# Patient Record
Sex: MALE | HISPANIC OR LATINO | Employment: UNEMPLOYED | URBAN - METROPOLITAN AREA
[De-identification: names, ages, dates, MRNs, and addresses within clinical notes are randomized per-mention and may not be internally consistent; named-entity substitution may affect disease eponyms.]

---

## 2018-03-31 ENCOUNTER — OFFICE VISIT (OUTPATIENT)
Dept: URGENT CARE | Facility: PHYSICIAN GROUP | Age: 38
End: 2018-03-31
Payer: COMMERCIAL

## 2018-03-31 VITALS
DIASTOLIC BLOOD PRESSURE: 80 MMHG | HEART RATE: 97 BPM | TEMPERATURE: 97.9 F | RESPIRATION RATE: 16 BRPM | BODY MASS INDEX: 29.82 KG/M2 | HEIGHT: 71 IN | SYSTOLIC BLOOD PRESSURE: 128 MMHG | WEIGHT: 213 LBS | OXYGEN SATURATION: 96 %

## 2018-03-31 DIAGNOSIS — L30.9 ECZEMA, UNSPECIFIED TYPE: ICD-10-CM

## 2018-03-31 PROCEDURE — 99214 OFFICE O/P EST MOD 30 MIN: CPT | Performed by: NURSE PRACTITIONER

## 2018-03-31 RX ORDER — TRIAMCINOLONE ACETONIDE OINTMENT USP, 0.05% 0.5 MG/G
OINTMENT TOPICAL
Qty: 60 G | Refills: 0 | Status: SHIPPED | OUTPATIENT
Start: 2018-03-31 | End: 2021-03-06

## 2018-03-31 NOTE — PROGRESS NOTES
"Chief Complaint   Patient presents with   • Rash     for a \"while\" on and off.       HISTORY OF PRESENT ILLNESS: Patient is a 37 y.o. male who presents to urgent care today with complaints of a rash. States his rash is reoccurring, and has been for years. He has been seen and treated by dermatology for this in the past and diagnosed with eczema, today's rash is exactly similar. He has been using OTC creams without improvement. States he otherwise feels well. He denies fever, chills, malaise, joint pain. No known contacts have similar rash. He denies any new soaps, allergens, detergents etc. He does not have a PCP for follow-up.        There are no active problems to display for this patient.      Allergies:Patient has no known allergies.    Current Outpatient Prescriptions Ordered in [facility]   Medication Sig Dispense Refill   • TRIAMCINOLONE ACETONIDE, TOP, 0.05 % Ointment Apply thin layer to affected areas BID prn 60 g 0     No current Epic-ordered facility-administered medications on file.        Past Medical History:   Diagnosis Date   • Eczema        Social History   Substance Use Topics   • Smoking status: Current Every Day Smoker   • Smokeless tobacco: Never Used   • Alcohol use No       No family status information on file.   History reviewed. No pertinent family history.    ROS:  Review of Systems   Constitutional: Negative for fever, chills, weight loss, malaise, and fatigue.   HENT: Negative for ear pain, nosebleeds, congestion, sore throat and neck pain.    Eyes: Negative for vision changes.   Neuro: Negative for headache, sensory changes, weakness, seizure, LOC.   Cardiovascular: Negative for chest pain, palpitations, orthopnea and leg swelling.   Respiratory: Negative for cough, sputum production, shortness of breath and wheezing.   Gastrointestinal: Negative for abdominal pain, nausea, vomiting or diarrhea.   Genitourinary: Negative for dysuria, urgency and frequency.  Musculoskeletal: Negative for falls, " "neck pain, back pain, joint pain, myalgias.   Skin: Positive for rash. Negative for diaphoresis.     Exam:  Blood pressure 128/80, pulse 97, temperature 36.6 °C (97.9 °F), resp. rate 16, height 1.803 m (5' 11\"), weight 96.6 kg (213 lb), SpO2 96 %.  General: well-nourished, well-developed male in NAD  Head: normocephalic, atraumatic  Eyes: PERRLA, no conjunctival injection, acuity grossly intact, lids normal.  Ears: normal shape and symmetry. Gross auditory acuity is intact.  Nose: symmetrical without tenderness, no discharge.  Mouth/Throat: reasonable hygiene, no erythema, exudates or tonsillar enlargement.  Neck: no masses, range of motion within normal limits, no tracheal deviation. No obvious thyroid enlargement.   Lymph: no cervical adenopathy. No supraclavicular adenopathy.   Neuro: alert and oriented. Cranial nerves 1-12 grossly intact. No sensory deficit.   Cardiovascular: regular rate and rhythm. No edema.  Pulmonary: no distress. Chest is symmetrical with respiration, no wheezes, crackles, or rhonchi.   Musculoskeletal: no clubbing, appropriate muscle tone, gait is stable.  Skin: warm, dry, intact, no clubbing, no cyanosis. Warm, pink, dry.  Left hand with scattered maculopapular rash to dorsal aspect. Right hand has one small, clumped vesicular and scabbed lesion to dorsal aspect. No pustules, streaking, or drainage.   Psych: appropriate mood, affect, judgement.         Assessment/Plan:  1. Eczema, unspecified type  TRIAMCINOLONE ACETONIDE, TOP, 0.05 % Ointment         Supportive care, differential diagnoses, and indications for immediate follow-up discussed with patient.   Pathogenesis of diagnosis discussed including typical length and natural progression.   Instructed to return to clinic or nearest emergency department for any change in condition, further concerns, or worsening of symptoms.  Patient states understanding of the plan of care and discharge instructions.  Instructed to make an appointment, " to establish care and for follow up, with a primary care provider.  Previous clinic visit encounter reviewed and considered in medical decision making today.         Please note that this dictation was created using voice recognition software. I have made every reasonable attempt to correct obvious errors, but I expect that there are errors of grammar and possibly content that I did not discover before finalizing the note.      REGINO Howe.

## 2018-04-17 ENCOUNTER — SUPERVISING PHYSICIAN REVIEW (OUTPATIENT)
Dept: URGENT CARE | Facility: PHYSICIAN GROUP | Age: 38
End: 2018-04-17

## 2019-04-22 ENCOUNTER — OFFICE VISIT (OUTPATIENT)
Dept: URGENT CARE | Facility: PHYSICIAN GROUP | Age: 39
End: 2019-04-22
Payer: COMMERCIAL

## 2019-04-22 VITALS
TEMPERATURE: 98.4 F | HEART RATE: 75 BPM | BODY MASS INDEX: 26.6 KG/M2 | HEIGHT: 71 IN | DIASTOLIC BLOOD PRESSURE: 70 MMHG | OXYGEN SATURATION: 98 % | SYSTOLIC BLOOD PRESSURE: 130 MMHG | WEIGHT: 190 LBS

## 2019-04-22 DIAGNOSIS — N48.9 LESION OF PENIS: ICD-10-CM

## 2019-04-22 DIAGNOSIS — L30.9 ECZEMA OF HAND: ICD-10-CM

## 2019-04-22 PROCEDURE — 99213 OFFICE O/P EST LOW 20 MIN: CPT | Performed by: FAMILY MEDICINE

## 2019-04-22 RX ORDER — TRIAMCINOLONE ACETONIDE 5 MG/G
1 OINTMENT TOPICAL 2 TIMES DAILY PRN
Qty: 1 TUBE | Refills: 2 | Status: SHIPPED | OUTPATIENT
Start: 2019-04-22 | End: 2019-07-09 | Stop reason: SDUPTHER

## 2019-04-22 ASSESSMENT — ENCOUNTER SYMPTOMS
FEVER: 0
SHORTNESS OF BREATH: 0
SORE THROAT: 0

## 2019-04-22 NOTE — PROGRESS NOTES
"Subjective:     Angus Flores is a 38 y.o. male who presents for Rash      HPI  Pt presents for evaluation of a new problem   Pt with lesion on penis which has been present for the past several months   Has not been evaluated by a physician before   Claims that the area feels \"bumpy\"   The area is stable and not worsening   Not painful   No associated dysuria      Also having eczema of the hands which is a chronic problem   Has previously used triamcinolone with good results   Has been trying OTC topical hydrocortisone and not improving   Has been present the past 2 weeks and not improving     Review of Systems   Constitutional: Negative for fever.   HENT: Negative for sore throat.    Respiratory: Negative for shortness of breath.    Cardiovascular: Negative for chest pain.   Genitourinary: Negative for dysuria and hematuria.   Skin: Positive for itching and rash.       PMH:  has a past medical history of Eczema. He also has no past medical history of Allergy; ASTHMA; or Diabetes.  MEDS:   Current Outpatient Prescriptions:   •  triamcinolone (ARISTOCORT) 0.5 % ointment, Apply 1 Application to affected area(s) 2 times a day as needed., Disp: 1 Tube, Rfl: 2  •  TRIAMCINOLONE ACETONIDE, TOP, 0.05 % Ointment, Apply thin layer to affected areas BID prn, Disp: 60 g, Rfl: 0  ALLERGIES: No Known Allergies  SURGHX: History reviewed. No pertinent surgical history.  SOCHX:  reports that he has been smoking.  He has never used smokeless tobacco. He reports that he does not drink alcohol or use drugs.     Objective:   /70   Pulse 75   Temp 36.9 °C (98.4 °F) (Temporal)   Ht 1.803 m (5' 11\")   Wt 86.2 kg (190 lb)   SpO2 98%   BMI 26.50 kg/m²     Physical Exam   Constitutional: He is oriented to person, place, and time. He appears well-developed and well-nourished. No distress.   HENT:   Head: Normocephalic and atraumatic.   Genitourinary: Penis normal. Circumcised.   Genitourinary Comments: Glans of penis with faint " erythema and dry appearing skin, no papules, macules, or lumps palpated    Neurological: He is alert and oriented to person, place, and time.   Skin: Skin is warm and dry. He is not diaphoretic.   Few small areas of dry and erythematous skin over the dorsal aspect of bilateral hands    Psychiatric: He has a normal mood and affect. His behavior is normal. Judgment and thought content normal.     Assessment/Plan:   Assessment    1. Eczema of hand  - triamcinolone (ARISTOCORT) 0.5 % ointment; Apply 1 Application to affected area(s) 2 times a day as needed.  Dispense: 1 Tube; Refill: 2    2. Lesion of penis  Unclear if eczema of glans of penis versus very mild case of condyloma.  Patient will be further evaluated by urology for second opinion.  - REFERRAL TO UROLOGY

## 2019-06-04 ENCOUNTER — OFFICE VISIT (OUTPATIENT)
Dept: MEDICAL GROUP | Facility: PHYSICIAN GROUP | Age: 39
End: 2019-06-04
Payer: COMMERCIAL

## 2019-06-04 VITALS
TEMPERATURE: 97.8 F | HEIGHT: 71 IN | HEART RATE: 92 BPM | WEIGHT: 190 LBS | DIASTOLIC BLOOD PRESSURE: 72 MMHG | SYSTOLIC BLOOD PRESSURE: 110 MMHG | OXYGEN SATURATION: 96 % | RESPIRATION RATE: 12 BRPM | BODY MASS INDEX: 26.6 KG/M2

## 2019-06-04 DIAGNOSIS — G89.29 CHRONIC MIDLINE THORACIC BACK PAIN: ICD-10-CM

## 2019-06-04 DIAGNOSIS — Z23 NEED FOR VACCINATION: ICD-10-CM

## 2019-06-04 DIAGNOSIS — N48.9 LESION OF PENIS: ICD-10-CM

## 2019-06-04 DIAGNOSIS — L20.84 INTRINSIC ECZEMA: ICD-10-CM

## 2019-06-04 DIAGNOSIS — M54.6 CHRONIC MIDLINE THORACIC BACK PAIN: ICD-10-CM

## 2019-06-04 DIAGNOSIS — Z00.00 ROUTINE HEALTH MAINTENANCE: ICD-10-CM

## 2019-06-04 PROCEDURE — 99213 OFFICE O/P EST LOW 20 MIN: CPT | Mod: 25 | Performed by: NURSE PRACTITIONER

## 2019-06-04 PROCEDURE — 90715 TDAP VACCINE 7 YRS/> IM: CPT | Performed by: NURSE PRACTITIONER

## 2019-06-04 PROCEDURE — 90471 IMMUNIZATION ADMIN: CPT | Performed by: NURSE PRACTITIONER

## 2019-06-04 NOTE — ASSESSMENT & PLAN NOTE
"This is a chronic problem for the patient that has been going on for \"a while\".  Patient states that he was previously engaged and his ex fiancée had mentioned once during oral sex that his penis \"felt bumpy\".  The patient was unaware of the lesion on the tip of his penis as it does not cause pain.  Recently, it has been causing some itchiness, he is embarrassed by it, which is interfering with his sexual life.  The patient was seen in urgent care for this issue and prescribed a steroid cream and referred to urology.  Patient states that he has been seen by the urologist and was told that they do not know what the lesion is, possibly a bad case of eczema.  The patient was prescribed another cream and states that he has noticed mild improvement but it has not resolved.  States the urologist had considered biopsy or laser.  Patient states that urgent care provider and urologist told him that it did not look like it was herpes or contagious.  "

## 2019-06-04 NOTE — ASSESSMENT & PLAN NOTE
This is a chronic problem for the patient that is ongoing.  Patient has not been seen for this issue past.  States that during middle school screening he was told that he had scoliosis, it is unsure of what degree, has never been seen for this issue, has never received treatment, and states that he did not even tell his parents.  Patient currently works 2 jobs and is noticing some thoracic back pain at the end of his day that is mild.

## 2019-06-04 NOTE — PROGRESS NOTES
"CC:   Chief Complaint   Patient presents with   • Establish Care     new patient     HISTORY OF THE PRESENT ILLNESS: Patient is a 38 y.o. male. This pleasant patient is here today to establish care and discuss multiple issues as listed below.    Health Maintenance: Completed    Intrinsic eczema  This is a chronic problem for the patient that has been going on for \"a while\".  Patient states that he was previously engaged and his ex fiancée had mentioned once during oral sex that his penis \"felt bumpy\".  The patient was unaware of the lesion on the tip of his penis as it does not cause pain.  Recently, it has been causing some itchiness, he is embarrassed by it, which is interfering with his sexual life.  The patient was seen in urgent care for this issue and prescribed a steroid cream and referred to urology.  Patient states that he has been seen by the urologist and was told that they do not know what the lesion is, possibly a bad case of eczema.  The patient was prescribed another cream and states that he has noticed mild improvement but it has not resolved.  States the urologist had considered biopsy or laser.  Patient states that urgent care provider and urologist told him that it did not look like it was herpes or contagious.    Chronic midline thoracic back pain  This is a chronic problem for the patient that is ongoing.  Patient has not been seen for this issue past.  States that during middle school screening he was told that he had scoliosis, it is unsure of what degree, has never been seen for this issue, has never received treatment, and states that he did not even tell his parents.  Patient currently works 2 jobs and is noticing some thoracic back pain at the end of his day that is mild.    Allergies: Patient has no known allergies.    Current Outpatient Prescriptions Ordered in [facility]   Medication Sig Dispense Refill   • triamcinolone (ARISTOCORT) 0.5 % ointment Apply 1 Application to affected area(s) 2 " times a day as needed. 1 Tube 2   • TRIAMCINOLONE ACETONIDE, TOP, 0.05 % Ointment Apply thin layer to affected areas BID prn 60 g 0     No current Epic-ordered facility-administered medications on file.        Past Medical History:   Diagnosis Date   • Eczema        History reviewed. No pertinent surgical history.    Social History   Substance Use Topics   • Smoking status: Former Smoker     Quit date: 12/2018   • Smokeless tobacco: Never Used      Comment: Marijuana, not tobacco   • Alcohol use No       Social History     Social History Narrative   • No narrative on file       Family History   Problem Relation Age of Onset   • Lung Disease Mother    • Obesity Mother    • Obesity Sister    • Heart Attack Brother    • Stroke Brother    • No Known Problems Maternal Grandmother    • Alcohol abuse Maternal Grandfather    • No Known Problems Sister    • Other Sister         Bell's Palsy   • Obesity Brother      ROS:   Constitutional:  Negative for fever, chills, unexpected weight change, night sweats, body aches, sleep issues, and fatigue/generalized weakness.   HEENT:  Negative for headaches, vision changes, hearing changes, ear pain, tinnitus, ear discharge, rhinorrhea, sinus congestion, sneezing, sore throat, and neck pain.    Respiratory:  Negative for cough, shortness of breath, sputum production, hemoptysis, chest congestion, dyspnea, wheezing, and crackles.    Cardiovascular:  Negative for chest pain, palpitations, JOHNS, paroxsymal nocturnal dyspnea, orthopnea, and bilateral lower extremity edema.   Gastrointestinal:  Negative for heartburn, nausea, vomiting, abdominal pain, hematochezia, melena, diarrhea, constipation, and greasy/foul-smelling stools.   Genitourinary: Positive for intermittent pruritic lesion on tip of penis.  Negative for dysuria, nocturia, polyuria, hematuria, pyuria, urinary urgency, urinary frequency, and urinary incontinence.   Musculoskeletal: Positive for thoracic back pain.  Negative for  "myalgias and joint pain.   Skin: Positive for intermittent pruritic lesion on tip of penis.  Neurological:  Negative for dizziness, tingling, tremors, focal sensory deficit, focal weakness and headaches.   Psychiatric/Behavioral: Negative for depression, suicidal/homicidal ideation and memory loss.        Exam: /72   Pulse 92   Temp 36.6 °C (97.8 °F)   Resp 12   Ht 1.803 m (5' 11\")   Wt 86.2 kg (190 lb)   SpO2 96%  Body mass index is 26.5 kg/m².    General:  Normal appearing. No distress.  HEENT:  Normocephalic. Eyes conjunctiva clear lids without ptosis, pupils equal and reactive to light accommodation, ears normal shape and contour, canals are clear bilaterally, tympanic membranes are benign, nasal mucosa benign, oropharynx is without erythema, edema or exudates.  Neck:  Supple without JVD or bruit.  Pulmonary:  Clear to ausculation.  Normal effort. No rales, ronchi, or wheezing.  Cardiovascular:  Regular rate and rhythm without murmur. Carotid and radial pulses are intact and equal bilaterally.  Abdomen:  Soft, nontender, nondistended. Normal bowel sounds.  Neurologic:  Grossly nonfocal.  Skin: Reports \"bumpy lesion on tip of penis\" declined assessment. Warm and dry.  Musculoskeletal:  Normal gait. No extremity cyanosis, clubbing, or edema.  Psych:  Normal mood and affect. Alert and oriented x3. Judgment and insight is normal.    Assessment/Plan:  1. Intrinsic eczema  REFERRAL TO DERMATOLOGY   2. Lesion of penis  REFERRAL TO DERMATOLOGY   3. Chronic midline thoracic back pain  DX-THORACIC SPINE-2 VIEWS   4. Routine health maintenance  Comp Metabolic Panel    Lipid Profile   5. Need for vaccination  Tdap =>6yo IM - given today     Educated in proper administration of medication(s) ordered today including safety, possible SE, risks, benefits, rationale and alternatives to therapy.   Supportive care, differential diagnoses, and indications for immediate follow-up discussed with patient.    Pathogenesis " of diagnosis discussed including typical length and natural progression.    Instructed to return to clinic or nearest emergency department for any change in condition, further concerns, or worsening of symptoms.  Patient states understanding of the plan of care and discharge instructions.    Return in about 1 month (around 7/4/2019) for Follow up Labs, Follow up Diagnostics.    I have placed the below orders and discussed them with an approved delegating provider. The MA is performing the below orders under the direction of Dr. Finch.    Please note that this dictation was created using voice recognition software. I have made every reasonable attempt to correct obvious errors, but I expect that there are errors of grammar and possibly content that I did not discover before finalizing the note.

## 2019-07-09 ENCOUNTER — OFFICE VISIT (OUTPATIENT)
Dept: MEDICAL GROUP | Facility: PHYSICIAN GROUP | Age: 39
End: 2019-07-09
Payer: COMMERCIAL

## 2019-07-09 ENCOUNTER — HOSPITAL ENCOUNTER (OUTPATIENT)
Dept: LAB | Facility: MEDICAL CENTER | Age: 39
End: 2019-07-09
Attending: NURSE PRACTITIONER
Payer: COMMERCIAL

## 2019-07-09 VITALS
DIASTOLIC BLOOD PRESSURE: 68 MMHG | SYSTOLIC BLOOD PRESSURE: 106 MMHG | BODY MASS INDEX: 26.6 KG/M2 | RESPIRATION RATE: 12 BRPM | WEIGHT: 190 LBS | HEIGHT: 71 IN | TEMPERATURE: 98.5 F | HEART RATE: 93 BPM | OXYGEN SATURATION: 96 %

## 2019-07-09 DIAGNOSIS — Z00.00 ROUTINE HEALTH MAINTENANCE: ICD-10-CM

## 2019-07-09 DIAGNOSIS — G89.29 CHRONIC MIDLINE THORACIC BACK PAIN: ICD-10-CM

## 2019-07-09 DIAGNOSIS — M54.6 CHRONIC MIDLINE THORACIC BACK PAIN: ICD-10-CM

## 2019-07-09 DIAGNOSIS — L30.9 ECZEMA OF HAND: ICD-10-CM

## 2019-07-09 DIAGNOSIS — L20.84 INTRINSIC ECZEMA: ICD-10-CM

## 2019-07-09 LAB
ALBUMIN SERPL BCP-MCNC: 4.6 G/DL (ref 3.2–4.9)
ALBUMIN/GLOB SERPL: 1.7 G/DL
ALP SERPL-CCNC: 46 U/L (ref 30–99)
ALT SERPL-CCNC: 15 U/L (ref 2–50)
ANION GAP SERPL CALC-SCNC: 9 MMOL/L (ref 0–11.9)
AST SERPL-CCNC: 12 U/L (ref 12–45)
BILIRUB SERPL-MCNC: 0.6 MG/DL (ref 0.1–1.5)
BUN SERPL-MCNC: 25 MG/DL (ref 8–22)
CALCIUM SERPL-MCNC: 10.1 MG/DL (ref 8.5–10.5)
CHLORIDE SERPL-SCNC: 106 MMOL/L (ref 96–112)
CHOLEST SERPL-MCNC: 165 MG/DL (ref 100–199)
CO2 SERPL-SCNC: 27 MMOL/L (ref 20–33)
CREAT SERPL-MCNC: 0.91 MG/DL (ref 0.5–1.4)
FASTING STATUS PATIENT QL REPORTED: NORMAL
GLOBULIN SER CALC-MCNC: 2.7 G/DL (ref 1.9–3.5)
GLUCOSE SERPL-MCNC: 92 MG/DL (ref 65–99)
HDLC SERPL-MCNC: 58 MG/DL
LDLC SERPL CALC-MCNC: 96 MG/DL
POTASSIUM SERPL-SCNC: 3.8 MMOL/L (ref 3.6–5.5)
PROT SERPL-MCNC: 7.3 G/DL (ref 6–8.2)
SODIUM SERPL-SCNC: 142 MMOL/L (ref 135–145)
TRIGL SERPL-MCNC: 57 MG/DL (ref 0–149)

## 2019-07-09 PROCEDURE — 36415 COLL VENOUS BLD VENIPUNCTURE: CPT

## 2019-07-09 PROCEDURE — 80053 COMPREHEN METABOLIC PANEL: CPT

## 2019-07-09 PROCEDURE — 80061 LIPID PANEL: CPT

## 2019-07-09 PROCEDURE — 99213 OFFICE O/P EST LOW 20 MIN: CPT | Performed by: NURSE PRACTITIONER

## 2019-07-09 RX ORDER — TRIAMCINOLONE ACETONIDE 5 MG/G
1 OINTMENT TOPICAL 2 TIMES DAILY PRN
Qty: 1 TUBE | Refills: 2 | Status: SHIPPED | OUTPATIENT
Start: 2019-07-09 | End: 2021-03-06

## 2019-07-09 ASSESSMENT — PATIENT HEALTH QUESTIONNAIRE - PHQ9: CLINICAL INTERPRETATION OF PHQ2 SCORE: 0

## 2019-07-09 NOTE — PROGRESS NOTES
CC:   Chief Complaint   Patient presents with   • Follow-Up     HISTORY OF THE PRESENT ILLNESS: Patient is a 38 y.o. male. This pleasant patient is here for his one month follow up to review lab results and xray results. The patient has not completed the labs or xray, states he has more questions.     Health Maintenance: Completed    Intrinsic eczema  This is a problem that is ongoing for the patient. The patient is requesting a refill of the aristocort 0.5% ointment. Was referred to dermatology for this problem at his last visit, states that he was not contacted with information on who to schedule an appointment with. Information provided.    Chronic midline thoracic back pain  This is an ongoing problem for the patient. At his last appointment he had requested an xray of his back, but he has not yet had this completed. He is wondering if there is anything that could be done to fix his back pain and if getting the xray is even worth it. Reviewed possible causes of back pain and possible therapies including physical therapy, braces, specialists. Patient would like to think about whether or not to get the xray.     Allergies: Patient has no known allergies.    Current Outpatient Prescriptions Ordered in [facility]   Medication Sig Dispense Refill   • triamcinolone (ARISTOCORT) 0.5 % ointment Apply 1 Application to affected area(s) 2 times a day as needed. 1 Tube 2   • TRIAMCINOLONE ACETONIDE, TOP, 0.05 % Ointment Apply thin layer to affected areas BID prn 60 g 0     No current Epic-ordered facility-administered medications on file.      Past Medical History:   Diagnosis Date   • Eczema      History reviewed. No pertinent surgical history.    Social History   Substance Use Topics   • Smoking status: Former Smoker     Quit date: 12/2018   • Smokeless tobacco: Never Used      Comment: Marijuana, not tobacco   • Alcohol use No     Social History     Social History Narrative   • No narrative on file     Family History   Problem  "Relation Age of Onset   • Lung Disease Mother    • Obesity Mother    • Obesity Sister    • Heart Attack Brother    • Stroke Brother    • No Known Problems Maternal Grandmother    • Alcohol abuse Maternal Grandfather    • No Known Problems Sister    • Other Sister         Bell's Palsy   • Obesity Brother      ROS:   Constitutional:  Negative for fever, chills, unexpected weight change, night sweats, body aches, sleep issues, and fatigue/generalized weakness.   Respiratory:  Negative for cough, shortness of breath, sputum production, hemoptysis, chest congestion, dyspnea, wheezing, and crackles.    Cardiovascular:  Negative for chest pain, palpitations, JOHNS, paroxsymal nocturnal dyspnea, orthopnea, and bilateral lower extremity edema.   Gastrointestinal:  Negative for heartburn, nausea, vomiting, abdominal pain, hematochezia, melena, diarrhea, constipation, and greasy/foul-smelling stools.   Musculoskeletal: Positive for thoracic back pain.  Negative for myalgias and joint pain.   Skin: Positive for intermittent pruritic lesion on tip of penis.  Psychiatric/Behavioral: Negative for depression, suicidal/homicidal ideation and memory loss.        Exam: /68   Pulse 93   Temp 36.9 °C (98.5 °F)   Resp 12   Ht 1.803 m (5' 11\")   Wt 86.2 kg (190 lb)   SpO2 96%  Body mass index is 26.5 kg/m².    General:  Normal appearing. No distress.  HEENT:  Normocephalic. Eyes conjunctiva clear lids without ptosis, pupils equal and reactive to light accommodation, ears normal shape and contour.  Neck:  Supple without JVD or bruit.  Pulmonary:  Clear to ausculation.  Normal effort. No rales, ronchi, or wheezing.  Cardiovascular:  Regular rate and rhythm without murmur. Carotid and radial pulses are intact and equal bilaterally.  Neurologic:  Grossly nonfocal.  Skin: Reports \"bumpy lesion on tip of penis\" declined assessment. Warm and dry.  Musculoskeletal:  Normal gait. No extremity cyanosis, clubbing, or edema.  Psych:  Normal " mood and affect. Alert and oriented x3. Judgment and insight is normal.    Assessment/Plan:  1. Intrinsic eczema  triamcinolone (ARISTOCORT) 0.5 % ointment  Contact information for dermatology referral provided to patient   2. Chronic midline thoracic back pain  Previously ordered xray, patient is still deciding whether or not he would like to get this done     Patient plans to go to the lab after this appointment to get previously ordered labs drawn.    Educated in proper administration of medication(s) ordered today including safety, possible SE, risks, benefits, rationale and alternatives to therapy.   Supportive care, differential diagnoses, and indications for immediate follow-up discussed with patient.    Pathogenesis of diagnosis discussed including typical length and natural progression.    Instructed to return to clinic or nearest emergency department for any change in condition, further concerns, or worsening of symptoms.  Patient states understanding of the plan of care and discharge instructions.    Return for Follow up Labs, Follow up Diagnostics, As needed.    Please note that this dictation was created using voice recognition software. I have made every reasonable attempt to correct obvious errors, but I expect that there are errors of grammar and possibly content that I did not discover before finalizing the note.

## 2019-07-09 NOTE — ASSESSMENT & PLAN NOTE
This is an ongoing problem for the patient. At his last appointment he had requested an xray of his back, but he has not yet had this completed. He is wondering if there is anything that could be done to fix his back pain and if getting the xray is even worth it. Reviewed possible causes of back pain and possible therapies including physical therapy, braces, specialists. Patient would like to think about whether or not to get the xray.

## 2019-07-09 NOTE — ASSESSMENT & PLAN NOTE
This is a problem that is ongoing for the patient. The patient is requesting a refill of the aristocort 0.5% ointment. Was referred to dermatology for this problem at his last visit, states that he was not contacted with information on who to schedule an appointment with. Information provided.

## 2020-12-10 DIAGNOSIS — L30.9 ECZEMA OF HAND: ICD-10-CM

## 2020-12-10 NOTE — TELEPHONE ENCOUNTER
Received request via: Pharmacy    Was the patient seen in the last year in this department? NO last seen on 07/09/2019    Does the patient have an active prescription (recently filled or refills available) for medication(s) requested? No

## 2020-12-11 RX ORDER — TRIAMCINOLONE ACETONIDE 5 MG/G
OINTMENT TOPICAL
Qty: 15 G | Refills: 0 | OUTPATIENT
Start: 2020-12-11

## 2021-03-06 ENCOUNTER — OFFICE VISIT (OUTPATIENT)
Dept: URGENT CARE | Facility: PHYSICIAN GROUP | Age: 41
End: 2021-03-06
Payer: COMMERCIAL

## 2021-03-06 VITALS
WEIGHT: 210 LBS | BODY MASS INDEX: 29.4 KG/M2 | HEART RATE: 97 BPM | TEMPERATURE: 97.5 F | SYSTOLIC BLOOD PRESSURE: 138 MMHG | OXYGEN SATURATION: 97 % | HEIGHT: 71 IN | DIASTOLIC BLOOD PRESSURE: 98 MMHG | RESPIRATION RATE: 15 BRPM

## 2021-03-06 DIAGNOSIS — L40.9 PSORIASIS: ICD-10-CM

## 2021-03-06 PROCEDURE — 99213 OFFICE O/P EST LOW 20 MIN: CPT | Performed by: FAMILY MEDICINE

## 2021-03-06 RX ORDER — TRIAMCINOLONE ACETONIDE 5 MG/G
OINTMENT TOPICAL
Qty: 15 G | Refills: 2 | Status: SHIPPED | OUTPATIENT
Start: 2021-03-06 | End: 2022-09-22

## 2021-03-06 ASSESSMENT — ENCOUNTER SYMPTOMS
FEVER: 0
BACK PAIN: 0
SORE THROAT: 0
COUGH: 0
VOMITING: 0

## 2021-03-06 NOTE — PROGRESS NOTES
"Subjective:     Angus Flores is a 40 y.o. male who presents for Medication Refill    HPI  Pt presents for rash in bilateral hands  Patient with chronic recurrent rash in his bilateral hands, worse on the dorsal aspect  Rash his become flared this past few weeks  Rash is erythematous with thick patches  Patches are itchy  Also has some rash on his bilateral medial ankles    Review of Systems   Constitutional: Negative for fever.   HENT: Negative for sore throat.    Respiratory: Negative for cough.    Gastrointestinal: Negative for vomiting.   Musculoskeletal: Negative for back pain.   Skin: Positive for itching and rash.     PMH:  has a past medical history of Eczema.  MEDS:   Current Outpatient Medications:   •  triamcinolone (ARISTOCORT) 0.5 % ointment, Apply to affected area BID x 1 week, Disp: 15 g, Rfl: 2  ALLERGIES: No Known Allergies  SURGHX: No past surgical history on file.  SOCHX:  reports that he quit smoking about 2 years ago. He has never used smokeless tobacco. He reports that he does not drink alcohol and does not use drugs.     Objective:   /98   Pulse 97   Temp 36.4 °C (97.5 °F)   Resp 15   Ht 1.803 m (5' 11\")   Wt 95.3 kg (210 lb)   SpO2 97%   BMI 29.29 kg/m²     Physical Exam  Constitutional:       General: He is not in acute distress.     Appearance: He is well-developed. He is not diaphoretic.   HENT:      Head: Normocephalic and atraumatic.   Pulmonary:      Effort: Pulmonary effort is normal.   Skin:     General: Skin is warm and dry.      Comments: Bilateral dorsal hands with few thick erythematous/silver scaly patches with no open wound or bleeding   Neurological:      Mental Status: He is alert and oriented to person, place, and time.   Psychiatric:         Behavior: Behavior normal.         Thought Content: Thought content normal.         Judgment: Judgment normal.       Assessment/Plan:   Assessment    1. Psoriasis  - triamcinolone (ARISTOCORT) 0.5 % ointment; Apply to affected " area BID x 1 week  Dispense: 15 g; Refill: 2    Patient with psoriasis.  Will give triamcinolone ointment as this has worked well for him in the past.  Patient denies any back pain, though did discuss existence of psoriatic arthritis and need to be checked out for this.  Patient did see a dermatologist in the past and recommended he return to his dermatologist discuss long-term management.  Patient is agreeable to this and will follow up with dermatology.

## 2022-09-22 ENCOUNTER — OFFICE VISIT (OUTPATIENT)
Dept: MEDICAL GROUP | Facility: PHYSICIAN GROUP | Age: 42
End: 2022-09-22

## 2022-09-22 VITALS
TEMPERATURE: 97.4 F | HEIGHT: 71 IN | WEIGHT: 233 LBS | OXYGEN SATURATION: 96 % | SYSTOLIC BLOOD PRESSURE: 144 MMHG | DIASTOLIC BLOOD PRESSURE: 88 MMHG | BODY MASS INDEX: 32.62 KG/M2 | HEART RATE: 79 BPM

## 2022-09-22 DIAGNOSIS — L20.84 INTRINSIC ECZEMA: ICD-10-CM

## 2022-09-22 DIAGNOSIS — E66.09 CLASS 1 OBESITY DUE TO EXCESS CALORIES WITHOUT SERIOUS COMORBIDITY WITH BODY MASS INDEX (BMI) OF 32.0 TO 32.9 IN ADULT: ICD-10-CM

## 2022-09-22 DIAGNOSIS — Z00.00 ROUTINE HEALTH MAINTENANCE: ICD-10-CM

## 2022-09-22 PROCEDURE — 99214 OFFICE O/P EST MOD 30 MIN: CPT | Performed by: NURSE PRACTITIONER

## 2022-09-22 RX ORDER — TRIAMCINOLONE ACETONIDE 5 MG/G
1 OINTMENT TOPICAL 2 TIMES DAILY PRN
Qty: 45 G | Refills: 3 | Status: SHIPPED | OUTPATIENT
Start: 2022-09-22

## 2022-09-22 NOTE — PROGRESS NOTES
"CC:   Chief Complaint   Patient presents with    Rash     hands     HISTORY OF THE PRESENT ILLNESS: Patient is a 42 y.o. male. This pleasant patient is here today to request medication refill.    Health Maintenance: Reviewed    Intrinsic eczema  Chronic, ongoing.  Patient continues to use triamcinolone 0.5% ointment twice daily as needed to affected areas on hands.  He has been referred to dermatology in the past, but was unable to schedule.  States that he has run out of his ointment and he is currently having an exacerbation of eczema on the back of bilateral hands.  Requesting refill.    Allergies: Patient has no known allergies.  Current Outpatient Medications Ordered in Epic   Medication Sig Dispense Refill    triamcinolone (ARISTOCORT) 0.5 % ointment Apply 1 Application topically 2 times a day as needed (eczema). 45 g 3     No current [facility]-ordered facility-administered medications on file.     Past Medical History:   Diagnosis Date    Eczema      History reviewed. No pertinent surgical history.  Social History     Tobacco Use    Smoking status: Former     Types: Cigarettes     Quit date: 12/2018     Years since quitting: 3.8    Smokeless tobacco: Never    Tobacco comments:     Marijuana, not tobacco   Substance Use Topics    Alcohol use: No    Drug use: Yes     Types: Marijuana     Comment: Quit 12/2018 - about 15 years     Social History     Social History Narrative    Not on file     Family History   Problem Relation Age of Onset    Lung Disease Mother     Obesity Mother     Obesity Sister     Heart Attack Brother     Stroke Brother     No Known Problems Maternal Grandmother     Alcohol abuse Maternal Grandfather     No Known Problems Sister     Other Sister         Bell's Palsy    Obesity Brother      ROS:   See HPI      Exam: BP (!) 144/88 (BP Location: Left arm, Patient Position: Sitting, BP Cuff Size: Adult)   Pulse 79   Temp 36.3 °C (97.4 °F) (Temporal)   Ht 1.803 m (5' 11\")   Wt 106 kg (233 lb)   " SpO2 96%  Body mass index is 32.5 kg/m².    General: Well nourished, well developed male in NAD, awake and conversant.  Eyes: Normal conjunctiva, anicteric.  Round symmetrical pupils.  ENT: Hearing grossly intact.  No nasal discharge.  Neck: Neck is supple.  No masses or thyromegaly.  CV: No lower extremity edema.  Respiratory: Respirations are nonlabored.  No wheezing.  Abdomen: Non-Distended.  Skin: Warm.  Erythematous plaques on bilateral back of hands with evidence of excoriation.   MSK: Normal ambulation.  No clubbing or cyanosis.  Neuro: Sensation and CN II-XII grossly normal.  Psych: Alert and oriented.  Cooperative, appropriate mood and affect, normal judgment.      Assessment/Plan:  1. Intrinsic eczema  Chronic, currently uncontrolled.  Refill for triamcinolone 0.5% ointment sent to pharmacy, may use twice daily only as needed.  Referral to dermatology.  Due for updated annual labs prior to follow-up.  - triamcinolone (ARISTOCORT) 0.5 % ointment; Apply 1 Application topically 2 times a day as needed (eczema).  Dispense: 45 g; Refill: 3  - Referral to Dermatology  - CBC WITH DIFFERENTIAL; Future  - TSH WITH REFLEX TO FT4; Future    2. Class 1 obesity due to excess calories without serious comorbidity with body mass index (BMI) of 32.0 to 32.9 in adult  Due for updated annual labs prior to follow-up.  - CBC WITH DIFFERENTIAL; Future  - Comp Metabolic Panel; Future  - Lipid Profile; Future  - TSH WITH REFLEX TO FT4; Future  - Patient identified as having weight management issue.  Appropriate orders and counseling given.    3. Routine health maintenance  Due for updated annual labs prior to follow-up.  - CBC WITH DIFFERENTIAL; Future  - Comp Metabolic Panel; Future  - Lipid Profile; Future  - TSH WITH REFLEX TO FT4; Future     Educated in proper administration of medication(s) ordered today including safety, possible SE, risks, benefits, rationale and alternatives to therapy.   Supportive care, differential  diagnoses, and indications for immediate follow-up discussed with patient.    Pathogenesis of diagnosis discussed including typical length and natural progression.    Instructed to return to clinic or nearest emergency department for any change in condition, further concerns, or worsening of symptoms.  Patient states understanding of the plan of care and discharge instructions.    Return in about 1 month (around 10/22/2022) for Preventative Annual, Follow up Labs.    Please note that this dictation was created using voice recognition software. I have made every reasonable attempt to correct obvious errors, but I expect that there are errors of grammar and possibly content that I did not discover before finalizing the note.

## 2022-09-22 NOTE — ASSESSMENT & PLAN NOTE
Chronic, ongoing.  Patient continues to use triamcinolone 0.5% ointment twice daily as needed to affected areas on hands.  He has been referred to dermatology in the past, but was unable to schedule.  States that he has run out of his ointment and he is currently having an exacerbation of eczema on the back of bilateral hands.  Requesting refill.

## 2023-09-02 ENCOUNTER — HOSPITAL ENCOUNTER (EMERGENCY)
Facility: MEDICAL CENTER | Age: 43
End: 2023-09-02
Attending: EMERGENCY MEDICINE

## 2023-09-02 VITALS
RESPIRATION RATE: 16 BRPM | DIASTOLIC BLOOD PRESSURE: 68 MMHG | HEART RATE: 88 BPM | HEIGHT: 71 IN | TEMPERATURE: 97.9 F | WEIGHT: 234.57 LBS | OXYGEN SATURATION: 95 % | SYSTOLIC BLOOD PRESSURE: 145 MMHG | BODY MASS INDEX: 32.84 KG/M2

## 2023-09-02 DIAGNOSIS — A51.49 SECONDARY SYPHILIS: ICD-10-CM

## 2023-09-02 DIAGNOSIS — K40.90 RIGHT INGUINAL HERNIA: ICD-10-CM

## 2023-09-02 DIAGNOSIS — B37.2 YEAST INFECTION OF THE SKIN: ICD-10-CM

## 2023-09-02 LAB — GLUCOSE BLD STRIP.AUTO-MCNC: 104 MG/DL (ref 65–99)

## 2023-09-02 PROCEDURE — 96372 THER/PROPH/DIAG INJ SC/IM: CPT

## 2023-09-02 PROCEDURE — 99283 EMERGENCY DEPT VISIT LOW MDM: CPT

## 2023-09-02 PROCEDURE — A9270 NON-COVERED ITEM OR SERVICE: HCPCS | Performed by: EMERGENCY MEDICINE

## 2023-09-02 PROCEDURE — 700102 HCHG RX REV CODE 250 W/ 637 OVERRIDE(OP): Performed by: EMERGENCY MEDICINE

## 2023-09-02 PROCEDURE — 82962 GLUCOSE BLOOD TEST: CPT

## 2023-09-02 PROCEDURE — 700111 HCHG RX REV CODE 636 W/ 250 OVERRIDE (IP): Performed by: EMERGENCY MEDICINE

## 2023-09-02 RX ORDER — NYSTATIN 100000 [USP'U]/G
1 POWDER TOPICAL 3 TIMES DAILY
Qty: 30 G | Refills: 1 | Status: SHIPPED | OUTPATIENT
Start: 2023-09-02 | End: 2023-09-12

## 2023-09-02 RX ORDER — NYSTATIN 100000 [USP'U]/G
POWDER TOPICAL ONCE
Status: COMPLETED | OUTPATIENT
Start: 2023-09-02 | End: 2023-09-02

## 2023-09-02 RX ADMIN — PENICILLIN G BENZATHINE 2.4 MILLION UNITS: 2400000 INJECTION, SUSPENSION INTRAMUSCULAR at 11:18

## 2023-09-02 RX ADMIN — NYSTATIN: 100000 POWDER TOPICAL at 11:18

## 2023-09-02 ASSESSMENT — PAIN DESCRIPTION - PAIN TYPE: TYPE: ACUTE PAIN

## 2023-09-02 NOTE — ED TRIAGE NOTES
"Chief Complaint   Patient presents with    Hernia     Noticed some pain to his RT groin x a few months ago with coughing. Then began bulging out about a week ago. Pain now worse.      Ambulatory to triage for above. Denies dysuria.     BP (!) 175/114   Pulse (!) 103   Temp 35.9 °C (96.7 °F) (Temporal)   Resp 18   Ht 1.803 m (5' 11\")   Wt 106 kg (234 lb 9.1 oz)   SpO2 97%   BMI 32.72 kg/m²     "

## 2023-09-02 NOTE — ED PROVIDER NOTES
ER Provider Note    Scribed for Julian Garcia M.D. by Chrissy Woods. 9/2/2023   10:40 AM    Primary Care Provider: MATIAS Díaz    CHIEF COMPLAINT  Chief Complaint   Patient presents with    Hernia     Noticed some pain to his RT groin x a few months ago with coughing. Then began bulging out about a week ago. Pain now worse.      EXTERNAL RECORDS REVIEWED  No prior ED visits for review.    HPI/ROS  LIMITATION TO HISTORY   Select: : None    OUTSIDE HISTORIAN(S):  None noted    Angus Flores is a 43 y.o. male who presents to the ED for evaluation of a right sided hernia noticed 1-2 months ago, but bulging out 1 week ago. He reports increased pain since it has bulged out. No alleviating factors attempted. He has not attempted to push the hernia back in. He notes associated testicle and scrotal pain. He denies any dysuria or vomiting.   He adds that he went to donate plasma and tested positive for syphilis.    PAST MEDICAL HISTORY  Past Medical History:   Diagnosis Date    Eczema        SURGICAL HISTORY  History reviewed. No pertinent surgical history.    FAMILY HISTORY  Family History   Problem Relation Age of Onset    Lung Disease Mother     Obesity Mother     Obesity Sister     Heart Attack Brother     Stroke Brother     No Known Problems Maternal Grandmother     Alcohol abuse Maternal Grandfather     No Known Problems Sister     Other Sister         Bell's Palsy    Obesity Brother        SOCIAL HISTORY   reports that he quit smoking about 4 years ago. His smoking use included cigarettes. He has never used smokeless tobacco. He reports current drug use. Drug: Marijuana. He reports that he does not drink alcohol.    CURRENT MEDICATIONS  Previous Medications    TRIAMCINOLONE (ARISTOCORT) 0.5 % OINTMENT    Apply 1 Application topically 2 times a day as needed (eczema).       ALLERGIES  No Known Allergies     PHYSICAL EXAM  BP (!) 175/114   Pulse (!) 103   Temp 35.9 °C (96.7 °F) (Temporal)   Resp 18   Ht  "1.803 m (5' 11\")   Wt 106 kg (234 lb 9.1 oz)   SpO2 97%   BMI 32.72 kg/m²      Nursing note and vitals reviewed.  Constitutional: Well-developed and well-nourished. No distress.   HENT: Head is normocephalic and atraumatic. Oropharynx is clear and moist without exudate or erythema.   Eyes: Pupils are equal, round, and reactive to light. Conjunctiva are normal.   Cardiovascular: Normal rate and regular rhythm. No murmur heard. Normal radial pulses.  Pulmonary/Chest: Breath sounds normal. No wheezes or rales.   Abdominal: Soft and non-tender. No distention, reducible right inguinal hernia.   Musculoskeletal: Extremities exhibit normal range of motion without edema or tenderness.   Neurological: Awake, alert and oriented to person, place, and time. No focal deficits noted.  Skin: Skin is warm and dry. rash on abdomen concerting for secondary syphilis.   Psychiatric: Normal mood and affect. Appropriate for clinical situation    DIAGNOSTIC STUDIES    Labs:   Results for orders placed or performed during the hospital encounter of 09/02/23   POCT glucose device results   Result Value Ref Range    POC Glucose, Blood 104 (H) 65 - 99 mg/dL       INITIAL ASSESSMENT AND PLAN    10:40 AM - Patient was evaluated at bedside for a hernia. Ordered for POC Blood Glucose to evaluate. The patient will be medicated with Mycostatin for his symptoms. Patient verbalizes understanding and support with my plan of care.     ED Observation Status? Yes; I am placing the patient in to an observation status due to a diagnostic uncertainty as well as therapeutic intensity. Patient placed in observation status at 10:42 AM, 9/2/2023.     Observation plan is as follows: Monitor for symptom management and diagnostic results.     Upon Reevaluation, the patient's condition has: Improved; and will be discharged.    Patient discharged from ED Observation status at 11:37 AM, 9/2/2023.        COURSE AND MEDICAL DECISION MAKING    11:37 AM - Patient " advised of plan for discharge and follow-up as detailed below. He was instructed to follow-up with his primary care provider and surgery to discuss the possibility of hernia repair. Patient verbalizes understanding and support with my plan for discharge.         Patient has a reducible right inguinal hernia.  No evidence of incarceration or obstruction.    Recent history of testing positive for syphilis.  He has a syphilitic rash consistent with secondary syphilis.  Treated with penicillin in the emergency department.  Referral placed to primary care.      DISPOSITION AND DISCUSSIONS    I have discussed management of the patient with the following physicians and ELENA's:  None    Discussion of management with other Q or appropriate source(s): None     Escalation of care considered, and ultimately not performed: diagnostic imaging and acute inpatient care management, however at this time, the patient is most appropriate for outpatient management.  Patient has no evidence of obstruction or incarceration therefore I feel that imaging is not warranted and the patient will be referred for outpatient repair.    Barriers to care at this time, including but not limited to:  None known at this time .     The patient will return for new or worsening symptoms and is stable at the time of discharge.    The patient is referred to a primary physician for blood pressure management, diabetic screening, and for all other preventative health concerns.      DISPOSITION:  Patient will be discharged home in stable condition.    FOLLOW UP:  Sydnee Rodríguez, USMANRYnesN.  [address]  [place] [id number] [phone]    Schedule an appointment as soon as possible for a visit       [facility], Emergency Dept  [address] [facility] [id number] [phone]    If symptoms worsen    [place] [address] [facility] [address]  [phone]  Schedule an appointment as soon as possible for a visit        Gunner Garza M.D.  [address] [address]  [phone]    Schedule an appointment as soon as possible for a visit   ask about hernia repair      OUTPATIENT MEDICATIONS:  New Prescriptions    NYSTATIN (MYCOSTATIN) POWDER    Apply 1 g topically 3 times a day for 10 days.       FINAL DIAGNOSIS  1. Secondary syphilis    2. Right inguinal hernia    3. Yeast infection of the skin         IChrissy (Scribe), am scribing for, and in the presence of, Julian Garcia M.D..    Electronically signed by: Chrissy Woods (Scribe), 9/2/2023    IJulian M.D. personally performed the services described in this documentation, as scribed by hCrissy Woods in my presence, and it is both accurate and complete.      The note accurately reflects work and decisions made by me.  Julian Garcia M.D.  9/2/2023  3:43 PM

## 2024-06-14 ENCOUNTER — OFFICE VISIT (OUTPATIENT)
Dept: URGENT CARE | Facility: PHYSICIAN GROUP | Age: 44
End: 2024-06-14

## 2024-06-14 VITALS
HEIGHT: 71 IN | RESPIRATION RATE: 16 BRPM | SYSTOLIC BLOOD PRESSURE: 134 MMHG | TEMPERATURE: 98 F | DIASTOLIC BLOOD PRESSURE: 84 MMHG | BODY MASS INDEX: 31.21 KG/M2 | WEIGHT: 222.9 LBS | OXYGEN SATURATION: 97 % | HEART RATE: 92 BPM

## 2024-06-14 DIAGNOSIS — L30.9 ECZEMA OF BOTH HANDS: ICD-10-CM

## 2024-06-14 PROCEDURE — 3075F SYST BP GE 130 - 139MM HG: CPT

## 2024-06-14 PROCEDURE — 3079F DIAST BP 80-89 MM HG: CPT

## 2024-06-14 PROCEDURE — 99213 OFFICE O/P EST LOW 20 MIN: CPT

## 2024-06-14 RX ORDER — TRIAMCINOLONE ACETONIDE 1 MG/G
1 OINTMENT TOPICAL 2 TIMES DAILY
Qty: 80 G | Refills: 0 | Status: SHIPPED | OUTPATIENT
Start: 2024-06-14 | End: 2024-06-21

## 2024-06-14 ASSESSMENT — ENCOUNTER SYMPTOMS
FEVER: 0
COUGH: 0

## 2024-06-14 NOTE — PROGRESS NOTES
Subjective:   Angus Flores is a very pleasant 43 y.o. male who presents for:    Chief Complaint   Patient presents with    Rash     On both hands, bumps, sometimes itchy  X 1 month        HPI:    Rash  The affected locations include the right hand and left hand. Pertinent negatives include no congestion, cough, fever or joint pain. Past treatments include nothing. His past medical history is significant for eczema.       ROS:    Review of Systems   Constitutional:  Negative for fever.   HENT:  Negative for congestion.    Respiratory:  Negative for cough.    Musculoskeletal:  Negative for joint pain.   Skin:  Positive for itching and rash.   All other systems reviewed and are negative.      Medications:      Current Outpatient Medications   Medication Sig    triamcinolone (ARISTOCORT) 0.5 % ointment Apply 1 Application topically 2 times a day as needed (eczema). (Patient not taking: Reported on 2024)       Allergies:     No Known Allergies    Problem List:     Patient Active Problem List   Diagnosis    Intrinsic eczema    Chronic midline thoracic back pain    Class 1 obesity due to excess calories without serious comorbidity with body mass index (BMI) of 32.0 to 32.9 in adult       Surgical History:    No past surgical history on file.    Past Social Hx:     Social History     Socioeconomic History    Marital status: Single   Tobacco Use    Smoking status: Former     Current packs/day: 0.00     Types: Cigarettes     Quit date: 2018     Years since quittin.5    Smokeless tobacco: Never    Tobacco comments:     Marijuana, not tobacco   Substance and Sexual Activity    Alcohol use: No    Drug use: Yes     Types: Marijuana     Comment: Quit 2018 - about 15 years    Sexual activity: Not Currently     Partners: Female        Past Family Hx:      Family History   Problem Relation Age of Onset    Lung Disease Mother     Obesity Mother     Obesity Sister     Heart Attack Brother     Stroke Brother     No  Known Problems Maternal Grandmother     Alcohol abuse Maternal Grandfather     No Known Problems Sister     Other Sister         Bell's Palsy    Obesity Brother        Problem list, medications, and allergies reviewed by myself today in Epic.     Objective:     Vitals:    06/14/24 0806   BP: 134/84   Pulse: 92   Resp: 16   Temp: 36.7 °C (98 °F)   SpO2: 97%       Physical Exam  Vitals reviewed.   Constitutional:       Appearance: Normal appearance. He is normal weight.   Skin:            Comments: Erythematous dry, maculopapular rash with fine scale on the dorsal aspects of the bilateral hands.    Neurological:      Mental Status: He is alert.                 Assessment/Plan:     Diagnosis and associated orders:     1. Eczema of both hands  - triamcinolone acetonide (KENALOG) 0.1 % Ointment; Apply 1 Application topically 2 times a day for 7 days.  Dispense: 80 g; Refill: 0          Comments/MDM:      Eczema   - discussed diagnosis, management options, and expectations of treatment  - start triamcinolone 0.1% ointment to affected area of the body twice daily. Patient instructed to avoid face, axilla, and groin area. Side effects discussed, including skin thinning, appearance of stretch marks (striae), easy bruising, telangiectasias, and possible increased hair growth   - extensively discussed importance of regular moisturization to the affected area during flares, and also for maintenance therapy liberally, several times a day, to protect the skin barrier. OTC moisturizers recommended  - discussed good bathing habits, including soap for sensitive skin (fragrance free), lukewarm water  - emphasized importance of fragrance free products, including detergent          All questions answered. Patient verbalized understanding and is in agreement with this plan of care.     If symptoms are worsening or not improving in 3-5 days, follow-up with PCP or return to . Differential diagnosis, natural history, and supportive care  discussed. AVS handout given and reviewed with patient. Patient educated on red flags and when to seek treatment back in ED or UC.     I personally reviewed prior external notes and test results pertinent to today's visit.  I have independently reviewed and interpreted all diagnostics ordered during this urgent care visit.     This dictation has been created using voice recognition software. The accuracy of the dictation is limited by the abilities of the software. I expect there may be some errors of grammar and possibly content. I made every attempt to manually correct the errors within my dictation. However, errors related to voice recognition software may still exist and should be interpreted within the appropriate context.    This note was electronically signed by MATIAS Whitfield